# Patient Record
(demographics unavailable — no encounter records)

---

## 2025-07-28 NOTE — END OF VISIT
[FreeTextEntry3] : I, Brian Higginbotham solely acted as scribe for Dr. Edna Healy on 07/22/2025  All medical entries made by the Scribe were at my, Dr. Joiner, direction and personally dictated by me on 07/22/2025 . I have reviewed the chart and agree that the record accurately reflects my personal performance of the history, physical exam, assessment and plan. I have also personally directed, reviewed, and agreed with the chart.

## 2025-07-28 NOTE — HISTORY OF PRESENT ILLNESS
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 [Y] : Positive pregnancy history [Menarche Age: ____] : age at menarche was [unfilled] [Menopause Age: ____] : age at menopause was [unfilled] [No] : Patient does not have concerns regarding sex [Previously active] : previously active [Mammogramdate] : 2/22/2024 [TextBox_19] : BR1 [BreastSonogramDate] : 4/17/2023 [TextBox_25] : BR1 [PapSmeardate] : 10/6/2021 [TextBox_31] : WNL [BoneDensityDate] : 10/25/2021 [TextBox_37] : NORMAL [ColonoscopyDate] : 2017 [HPVDate] : 10/6/2021 [TextBox_78] : NEG [LMPDate] : 2013 [PGHxTotal] : 2 [[facility]] : 2 [PGxPremature] : 1 [[facility]] : 3 [PGHxMultBirths] : 1 [FreeTextEntry1] : 2013

## 2025-07-28 NOTE — PLAN
[FreeTextEntry1] : UCX sent out but will give antibiotics as pt has had UTIs in the past and knows the symptoms. Prescription for Macrobid given.   Up to date with colon cancer screening.   Blind pap done today. If there are not enough cells, will try again next year.   Prescription for mammogram screening given. Self-breast exam reviewed.  She will follow up annually and as needed.

## 2025-07-28 NOTE — HISTORY OF PRESENT ILLNESS
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 [Y] : Positive pregnancy history [Menarche Age: ____] : age at menarche was [unfilled] [Menopause Age: ____] : age at menopause was [unfilled] [No] : Patient does not have concerns regarding sex [Previously active] : previously active [Mammogramdate] : 2/22/2024 [TextBox_19] : BR1 [BreastSonogramDate] : 4/17/2023 [TextBox_25] : BR1 [PapSmeardate] : 10/6/2021 [TextBox_31] : WNL [BoneDensityDate] : 10/25/2021 [TextBox_37] : NORMAL [ColonoscopyDate] : 2017 [HPVDate] : 10/6/2021 [TextBox_78] : NEG [LMPDate] : 2013 [PGHxTotal] : 2 [[facility]] : 2 [PGxPremature] : 1 [[facility] [place]] : 3 [PGHxMultBirths] : 1 [FreeTextEntry1] : 2013